# Patient Record
(demographics unavailable — no encounter records)

---

## 2024-10-28 NOTE — DISCUSSION/SUMMARY
[FreeTextEntry1] : Patient is a 68 year old RH male here for evaluation of balance problems.  symptoms onset 4 years back with balance problems, gait instability and tendency to fall backwards.  on exam noted to have mild dysmetria , broad based gait with difficulty with gait initiation and short stride length, recovers from pull test 4-5 steps. MRI brain showed cerebellar atrophy without any significant cortical atrophy. 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   Plan Check labs for myasthenia panel CPK aldolase Encouraged to follow-up with PCP as he is reporting swelling of legs, shortness of breath generalized weakness continue melatonin 3 mg daily to help with REM behavior disorder blood work: vitamin b12, copper, ceruloplasmin, paraneoplastic , ferritin wnl ataxia xpanded gene panel pending CORA scan to evaluate dopamine functioning - reordered today mr brain and cspine - Patient with difficulty walking some urinary incontinence rule out central etiology encourage physical therapy, use of her rollator Continue Sinemet for now Monitor for any changes in speech swallowing denies dysphagia Follow-up in  1-2 months all questions addressed  We discussed the above impression, plan and recommendation during the visit. Counseling represented more than 50% of the 30-minute visit time

## 2024-10-28 NOTE — PHYSICAL EXAM
[FreeTextEntry1] : Patient awake, alert oriented to time place person EOMI, no gaze restriction noted,  no nystagmus noted  no facial asymmetry  appropriate facial expression,? sl reduced blink rate motor no rest tremor mild bradykinesia grade1 in the left lower extremity   mild dysmetria on finger to nose testing left > rt and on heel to shin testing.    gait  needs to push himself up from sitting position. wide based gait hesitation with gait initiation noted  last visit sensory  crude sensation intact   reflexes. patella:2+ biceps:2+ brachioradialis 2+ pull test :retropulsion+ recovers with 4 -5 steps [] : no respiratory distress [Abdomen Soft] : soft [Skin Color & Pigmentation] : normal skin color and pigmentation

## 2024-10-28 NOTE — HISTORY OF PRESENT ILLNESS
[FreeTextEntry1] : Patient is 67 year old RH male here to establish care for balance problems.  Symptoms onset 3 years back with balance problems and falls. complains of occasional shuffling while walking with trouble turning. Patient complains of trouble getting up from a sitting position. complains of trouble going up or down the stairs, more so up the stairs. he tends to falls backwards.  Patient denies any tremor, denies any changes in handwriting.  non motor denies any speech changes. denies any constipation. sense of smell is good. sleep is okay, complains of kicking punching and yelling and dreams, never fallen out of bed from sleep mood and memory are okay. denies hallucinations.   MRI brain reviewed on sons laptop - T2 Flair : no cortical atrophy noted, cerebellar atrophy noted on sagittal view  MR cervical spine: multilevel degenerative changes, at C6-C7 mild central canal stenosis  MR L spine: multi level degenerative changes   Interval history October 28, 2024.  This is a telehealth visit.  Patient is accompanied by his daughter.  States that he is having a lot of difficulty walking he cannot walk without a walker and support from someone.  He has not had any recent physical therapy and will be starting some this week.  He does try to exercise every day.  He states that overall he feels that he has become very weak he also has difficulty speaking feels like his voice is more strained become soft his neck feels weak even with slight activity such as brushing his teeth he feels tired and there is swelling in his legs he feels shortness of breath.  He denies being sick otherwise no confusion no headache he sleeps for about 8 hours at night wakes up feeling rested.  Mood symptoms at the same he gets easily tearful.  Denies any difficulty swallowing he does report some cramping in his legs for example at 2 AM or something he may wake up because of cramping in his legs this is more if he is had a long car drive does not happen every night he notices some tremors especially when he is eating not much at rest.  Currently he is on Sinemet 1 tablet 3 times a day has not seen much benefit with that medication. Currently takes it at 12 noon, 8 PM and 12 midnight.  Has some urinary incontinence, denies dyspgahia or cognitive changes

## 2024-12-09 NOTE — HISTORY OF PRESENT ILLNESS
[Home] : at home, [unfilled] , at the time of the visit. [Medical Office: ([facility])___] : at the medical office located in  [Family Member] : family member [Verbal consent obtained from patient] : the patient, [unfilled] [FreeTextEntry1] : Patient is 67 year old RH male here to establish care for balance problems.  Symptoms onset 3 years back with balance problems and falls. complains of occasional shuffling while walking with trouble turning. Patient complains of trouble getting up from a sitting position. complains of trouble going up or down the stairs, more so up the stairs. he tends to falls backwards.  Patient denies any tremor, denies any changes in handwriting.  non motor denies any speech changes. denies any constipation. sense of smell is good. sleep is okay, complains of kicking punching and yelling and dreams, never fallen out of bed from sleep mood and memory are okay. denies hallucinations.   MRI brain reviewed on sons laptop - T2 Flair : no cortical atrophy noted, cerebellar atrophy noted on sagittal view  MR cervical spine: multilevel degenerative changes, at C6-C7 mild central canal stenosis  MR L spine: multi level degenerative changes   Interval history October 28, 2024.  This is a telehealth visit.  Patient is accompanied by his daughter.  States that he is having a lot of difficulty walking he cannot walk without a walker and support from someone.  He has not had any recent physical therapy and will be starting some this week.  He does try to exercise every day.  He states that overall he feels that he has become very weak he also has difficulty speaking feels like his voice is more strained become soft his neck feels weak even with slight activity such as brushing his teeth he feels tired and there is swelling in his legs he feels shortness of breath.  He denies being sick otherwise no confusion no headache he sleeps for about 8 hours at night wakes up feeling rested.  Mood symptoms at the same he gets easily tearful.  Denies any difficulty swallowing he does report some cramping in his legs for example at 2 AM or something he may wake up because of cramping in his legs this is more if he is had a long car drive does not happen every night he notices some tremors especially when he is eating not much at rest.  Currently he is on Sinemet 1 tablet 3 times a day has not seen much benefit with that medication. Currently takes it at 12 noon, 8 PM and 12 midnight.  Has some urinary incontinence, denies dyspgahia or cognitive changes  December 9, 2024.  This is a telehealth visit.  Patient is accompanied by his daughter and son is on the phone.  They state that since terazosin was stopped his leg swelling and difficulty breathing have been better.  He finds that he has less stamina to exercise now.  Has not had any falls.  Wish to follow results of the MRI.  DaTscan is scheduled in a few days.  He states that he has reduced Sinemet from 3 times a day to twice a day and has not felt any difference.  Never felt any benefit with Sinemet either.  Son also reports pseudobulbar affect with easy crying

## 2024-12-09 NOTE — DISCUSSION/SUMMARY
[FreeTextEntry1] : Patient is a 68 year old RH male here for evaluation of balance problems.  symptoms onset 4 years back with balance problems, gait instability and tendency to fall backwards.  on exam noted to have mild dysmetria , broad based gait with difficulty with gait initiation and short stride length, recovers from pull test 4-5 steps. MRI brain showed cerebellar atrophy without any significant cortical atrophy. 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   Plan Patient symptoms slightly better since reducing terazosin. 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  Will obtain MRI pituitary with and without contrast.  If suspicious for adenoma will order endocrine labs. Trial of Lexapro 5 mg daily for pseudobulbar affect. Patient does not find any benefit with levodopa will gradually reduce to once a day and then stop. Physical therapy. Ataxia panel was again ordered Physical therapy Follow-up in 3 months in person All questions were addressed and answered  We discussed the above impression, plan and recommendation during the visit. Counseling represented more than 50% of the 30-minute visit time